# Patient Record
Sex: MALE | Race: WHITE | Employment: UNEMPLOYED | URBAN - METROPOLITAN AREA
[De-identification: names, ages, dates, MRNs, and addresses within clinical notes are randomized per-mention and may not be internally consistent; named-entity substitution may affect disease eponyms.]

---

## 2022-10-22 ENCOUNTER — HOSPITAL ENCOUNTER (EMERGENCY)
Age: 37
Discharge: HOME | End: 2022-10-22
Attending: EMERGENCY MEDICINE

## 2022-10-22 VITALS
OXYGEN SATURATION: 98 % | HEART RATE: 78 BPM | DIASTOLIC BLOOD PRESSURE: 70 MMHG | TEMPERATURE: 97 F | RESPIRATION RATE: 18 BRPM | SYSTOLIC BLOOD PRESSURE: 129 MMHG

## 2022-10-22 DIAGNOSIS — H61.23 BILATERAL IMPACTED CERUMEN: Primary | ICD-10-CM

## 2022-10-22 PROCEDURE — 99283 EMERGENCY DEPT VISIT LOW MDM: CPT

## 2022-10-22 PROCEDURE — 69210 REMOVE IMPACTED EAR WAX UNI: CPT

## 2022-10-22 PROCEDURE — 6370000000 HC RX 637 (ALT 250 FOR IP): Performed by: STUDENT IN AN ORGANIZED HEALTH CARE EDUCATION/TRAINING PROGRAM

## 2022-10-22 RX ORDER — IBUPROFEN 800 MG/1
800 TABLET ORAL ONCE
Status: COMPLETED | OUTPATIENT
Start: 2022-10-22 | End: 2022-10-22

## 2022-10-22 RX ADMIN — IBUPROFEN 800 MG: 800 TABLET, FILM COATED ORAL at 07:09

## 2022-10-22 RX ADMIN — DOCUSATE SODIUM 50 MG: 50 LIQUID ORAL at 08:40

## 2022-10-22 ASSESSMENT — PAIN SCALES - GENERAL: PAINLEVEL_OUTOF10: 5

## 2022-10-22 NOTE — ED PROVIDER NOTES
[facility] ED     Emergency Department     Faculty Attestation    I performed a history and physical examination of the patient and discussed management with the resident. I reviewed the residents note and agree with the documented findings and plan of care. Any areas of disagreement are noted on the chart. I was personally present for the key portions of any procedures. I have documented in the chart those procedures where I was not present during the key portions. I have reviewed the emergency nurses triage note. I agree with the chief complaint, past medical history, past surgical history, allergies, medications, social and family history as documented unless otherwise noted below. For Physician Assistant/ Nurse Practitioner cases/documentation I have personally evaluated this patient and have completed at least one if not all key elements of the E/M (history, physical exam, and MDM). Additional findings are as noted. Ear pain bilateral cerumen impaction. No other URI or fever.   Will irrigate likely discharge home with drops      Critical Care     none    Velia Almeida MD, Indra Vazquez  Attending Emergency  Physician           Velia Almeida MD  10/22/22 4434

## 2022-10-22 NOTE — ED PROVIDER NOTES
[address]  ED  Emergency Department Encounter  Emergency Medicine Resident     Pt Name: Andrew Walters  MRN: [id number]  [place] 1985  Date of evaluation: 10/22/22  PCP:  Bartolo Arambula MD    CHIEF COMPLAINT       Chief Complaint   Patient presents with    Otalgia       HISTORY Dariusz  (Location/Symptom, Timing/Onset, Context/Setting, Quality, Duration, Modifying Factors,Severity.)      Andrew Walters is a 42 y. o.yo male who presents with ear pain, more over his left side. Patient states he has noticed ear popping. He states that the pain is a 5 out of 10, has not taken anything for the pain. He denies any feeling of dizziness, no fever, no chills, no chest pain, or shortness of breath, no blurry vision, no neck pain. PAST MEDICAL / SURGICAL / SOCIAL / FAMILY HISTORY      has a past medical history of Chronic back pain and Headache.     has no past surgical history on file. Social History     Socioeconomic History    Marital status: Single     Spouse name: Not on file    Number of children: Not on file    Years of education: Not on file    Highest education level: Not on file   Occupational History    Not on file   Tobacco Use    Smoking status: Every Day     Packs/day: 1.50     Years: 20.00     Pack years: 30.00     Types: Cigarettes     Start date: 4/15/1996    Smokeless tobacco: Current     Types: Chew   Substance and Sexual Activity    Alcohol use:  Yes     Alcohol/week: 1.0 standard drink     Types: 1 Cans of beer per week    Drug use: No    Sexual activity: Yes     Partners: Female   Other Topics Concern    Not on file   Social History Narrative    Not on file     Social Determinants of Health     Financial Resource Strain: Not on file   Food Insecurity: Not on file   Transportation Needs: Not on file   Physical Activity: Not on file   Stress: Not on file   Social Connections: Not on file   Intimate Partner Violence: Not on file   Housing Stability: Not on file History reviewed. No pertinent family history. Allergies:  Patient has no known allergies. Home Medications:  Prior to Admission medications    Not on File       REVIEW OFSYSTEMS    (2-9 systems for level 4, 10 or more for level 5)      Review of Systems   Constitutional:  Negative for fatigue and fever. HENT:  Positive for tinnitus. Negative for congestion. Ear popping   Eyes:  Negative for photophobia and visual disturbance. Respiratory:  Negative for cough and shortness of breath. Cardiovascular:  Negative for chest pain and leg swelling. Gastrointestinal:  Negative for nausea and vomiting. Genitourinary:  Negative for enuresis and flank pain. Musculoskeletal:  Negative for back pain. Skin:  Negative for rash and wound. Neurological:  Negative for facial asymmetry and headaches. Psychiatric/Behavioral:  Negative for behavioral problems and confusion. PHYSICAL EXAM   (up to 7 for level 4, 8 or more forlevel 5)      INITIAL VITALS:   ED Triage Vitals [10/22/22 0645]   BP Temp Temp Source Heart Rate Resp SpO2 Height Weight   129/70 97 °F (36.1 °C) Oral 78 18 98 % -- --       Physical Exam  Constitutional:       Appearance: Normal appearance. HENT:      Head: Normocephalic. Right Ear: There is impacted cerumen. Left Ear: There is impacted cerumen. Ears:      Comments: He does have bilateral cerumen impaction, no signs of otitis media or otitis externa, no mastoid tendernes     Mouth/Throat:      Mouth: Mucous membranes are moist.   Eyes:      Pupils: Pupils are equal, round, and reactive to light. Cardiovascular:      Pulses: Normal pulses. Pulmonary:      Effort: Pulmonary effort is normal. No respiratory distress. Abdominal:      General: There is no distension. Palpations: Abdomen is soft. Tenderness: There is no abdominal tenderness. Musculoskeletal:         General: No swelling. Cervical back: Normal range of motion.  No rigidity. Skin:     General: Skin is warm. Capillary Refill: Capillary refill takes less than 2 seconds. Coloration: Skin is not jaundiced. Neurological:      General: No focal deficit present. Mental Status: He is alert and oriented to person, place, and time. Psychiatric:         Mood and Affect: Mood normal.         Behavior: Behavior normal.       DIFFERENTIAL  DIAGNOSIS     PLAN (LABS / IMAGING / EKG):  No orders of the defined types were placed in this encounter. MEDICATIONS ORDERED:  Orders Placed This Encounter   Medications    ibuprofen (ADVIL;MOTRIN) tablet 800 mg    docusate (COLACE) 50 MG/5ML liquid 50 mg       Initial MDM/Plan: 42 y.o. male who presents with otalgia. On physical exam he does have bilateral cerumen impaction, no signs of otitis media or otitis externa, no mastoid tenderness    Plan for irrigation of ear using liquid Colace. We will plan for discharge after irrigation. DIAGNOSTIC RESULTS / EMERGENCYDEPARTMENT COURSE / MDM     LABS:  Labs Reviewed - No data to display      RADIOLOGY:  No results found. EKG      All EKG's are interpreted by the Emergency Department Physicianwho either signs or Co-signs this chart in the absence of a cardiologist.    EMERGENCY DEPARTMENT COURSE:          PROCEDURES:  None    CONSULTS:  None    CRITICAL CARE:      FINAL IMPRESSION      1. Bilateral impacted cerumen          DISPOSITION / PLAN     DISPOSITION Decision To Discharge 10/22/2022 09:01:42 AM      PATIENT REFERRED TO:  [facility]  [address] [facility] [address]  [phone]    If symptoms worsen    Zac Hensley MD  [address]   [place] [id number] [phone]      As needed    DISCHARGE MEDICATIONS:  Discharge Medication List as of 10/22/2022  9:02 AM          Ramila Young MD  Emergency Medicine Resident    (Please note that portions of this note were completed with a voice recognition program.Efforts were made to edit the dictations but occasionally words are mis-transcribed.)        Jose Piedra MD  Resident  10/23/22 0770

## 2022-10-23 ASSESSMENT — ENCOUNTER SYMPTOMS
COUGH: 0
PHOTOPHOBIA: 0
BACK PAIN: 0
SHORTNESS OF BREATH: 0
NAUSEA: 0
VOMITING: 0

## 2022-11-04 ENCOUNTER — TELEPHONE (OUTPATIENT)
Dept: FAMILY MEDICINE CLINIC | Age: 37
End: 2022-11-04

## 2022-11-04 NOTE — TELEPHONE ENCOUNTER
CP- to schedule new patient appointment, [facility] F/U, from 10/22/2022, no answer/no v-mail, invalid phone number [phone].

## 2023-06-29 ENCOUNTER — HOSPITAL ENCOUNTER (EMERGENCY)
Age: 38
Discharge: HOME | End: 2023-06-29
Attending: EMERGENCY MEDICINE

## 2023-06-29 VITALS
RESPIRATION RATE: 16 BRPM | OXYGEN SATURATION: 96 % | SYSTOLIC BLOOD PRESSURE: 134 MMHG | WEIGHT: 188.27 LBS | BODY MASS INDEX: 23.53 KG/M2 | TEMPERATURE: 98.2 F | DIASTOLIC BLOOD PRESSURE: 77 MMHG | HEART RATE: 84 BPM

## 2023-06-29 DIAGNOSIS — K08.89 PAIN, DENTAL: Primary | ICD-10-CM

## 2023-06-29 PROCEDURE — 6370000000 HC RX 637 (ALT 250 FOR IP): Performed by: EMERGENCY MEDICINE

## 2023-06-29 PROCEDURE — 99283 EMERGENCY DEPT VISIT LOW MDM: CPT

## 2023-06-29 RX ORDER — IBUPROFEN 400 MG/1
600 TABLET ORAL ONCE
Status: COMPLETED | OUTPATIENT
Start: 2023-06-29 | End: 2023-06-29

## 2023-06-29 RX ORDER — PENICILLIN V POTASSIUM 500 MG/1
500 TABLET ORAL 4 TIMES DAILY
Qty: 28 TABLET | Refills: 0 | Status: SHIPPED | OUTPATIENT
Start: 2023-06-29 | End: 2023-07-06

## 2023-06-29 RX ORDER — ACETAMINOPHEN 500 MG
1000 TABLET ORAL ONCE
Status: COMPLETED | OUTPATIENT
Start: 2023-06-29 | End: 2023-06-29

## 2023-06-29 RX ADMIN — ACETAMINOPHEN 1000 MG: 500 TABLET ORAL at 04:19

## 2023-06-29 RX ADMIN — IBUPROFEN 600 MG: 400 TABLET, FILM COATED ORAL at 04:19

## 2023-06-29 ASSESSMENT — PAIN DESCRIPTION - DESCRIPTORS: DESCRIPTORS: SHARP

## 2023-06-29 ASSESSMENT — PAIN DESCRIPTION - ORIENTATION: ORIENTATION: LOWER

## 2023-06-29 ASSESSMENT — ENCOUNTER SYMPTOMS
VOMITING: 0
COUGH: 0
VOICE CHANGE: 0
TROUBLE SWALLOWING: 0
ABDOMINAL PAIN: 0
SHORTNESS OF BREATH: 0
NAUSEA: 0

## 2023-06-29 ASSESSMENT — PAIN SCALES - GENERAL: PAINLEVEL_OUTOF10: 10

## 2023-06-29 ASSESSMENT — PAIN DESCRIPTION - LOCATION: LOCATION: TEETH

## 2023-06-29 ASSESSMENT — PAIN - FUNCTIONAL ASSESSMENT: PAIN_FUNCTIONAL_ASSESSMENT: 0-10

## 2023-07-07 ENCOUNTER — HOSPITAL ENCOUNTER (EMERGENCY)
Age: 38
Discharge: HOME | End: 2023-07-07
Attending: EMERGENCY MEDICINE

## 2023-07-07 ENCOUNTER — APPOINTMENT (OUTPATIENT)
Dept: GENERAL RADIOLOGY | Age: 38
End: 2023-07-07

## 2023-07-07 VITALS
RESPIRATION RATE: 14 BRPM | HEIGHT: 75 IN | WEIGHT: 173 LBS | SYSTOLIC BLOOD PRESSURE: 116 MMHG | OXYGEN SATURATION: 97 % | BODY MASS INDEX: 21.51 KG/M2 | TEMPERATURE: 98.2 F | DIASTOLIC BLOOD PRESSURE: 79 MMHG | HEART RATE: 90 BPM

## 2023-07-07 DIAGNOSIS — S41.111A LACERATION OF RIGHT UPPER EXTREMITY, INITIAL ENCOUNTER: Primary | ICD-10-CM

## 2023-07-07 PROCEDURE — 12002 RPR S/N/AX/GEN/TRNK2.6-7.5CM: CPT

## 2023-07-07 PROCEDURE — 99283 EMERGENCY DEPT VISIT LOW MDM: CPT

## 2023-07-07 PROCEDURE — 73070 X-RAY EXAM OF ELBOW: CPT

## 2023-07-07 PROCEDURE — 6370000000 HC RX 637 (ALT 250 FOR IP)

## 2023-07-07 RX ORDER — ACETAMINOPHEN 500 MG
1000 TABLET ORAL ONCE
Status: COMPLETED | OUTPATIENT
Start: 2023-07-07 | End: 2023-07-07

## 2023-07-07 RX ORDER — IBUPROFEN 600 MG/1
600 TABLET ORAL ONCE
Status: COMPLETED | OUTPATIENT
Start: 2023-07-07 | End: 2023-07-07

## 2023-07-07 RX ADMIN — IBUPROFEN 600 MG: 600 TABLET, FILM COATED ORAL at 21:44

## 2023-07-07 RX ADMIN — ACETAMINOPHEN 1000 MG: 500 TABLET ORAL at 21:44

## 2023-07-07 ASSESSMENT — PAIN SCALES - GENERAL
PAINLEVEL_OUTOF10: 10
PAINLEVEL_OUTOF10: 6

## 2023-07-07 ASSESSMENT — ENCOUNTER SYMPTOMS
BACK PAIN: 0
COLOR CHANGE: 0

## 2023-07-08 NOTE — DISCHARGE INSTRUCTIONS
Go to your primary care physician or return to the emergency department in 7-10 days to have your sutures removed. Call occupational health on Monday to follow-up on your work injury. For pain use acetaminophen (Tylenol) or ibuprofen (Motrin / Advil), unless prescribed medications that have acetaminophen or ibuprofen (or similar medications) in it. You can take over the counter acetaminophen tablets (1 - 2 tablets of the 500-mg strength every 6 hours) or ibuprofen tablets (2 tablets every 4 hours). You can shower with the laceration, would avoid baths or swimming in lakes / rivers. Apply bacitracin / triple antibiotic ointment / Neosporin / Vaseline to the wound twice a day. When you go outside, place sunscreen on the healing wound after the sutures have been removed for the next year to help with scarring. Monitor for signs and symptoms of infection such as increased redness, swelling, pain, drainage or odor. Any of these symptoms warrant an immediate re-evaluation. PLEASE RETURN TO THE EMERGENCY DEPARTMENT IMMEDIATELY for worsening symptoms, redness around the wound or redness streaking up the body part, white drainage from the wound, or if you develop any concerning symptoms such as: high fever not relieved by acetaminophen (Tylenol) and/or ibuprofen (Motrin / Advil), chills, shortness of breath, chest pain, feeling of your heart fluttering or racing, persistent nausea and/or vomiting, vomiting up blood, blood in your stool, numbness, loss of consciousness, weakness or tingling in the arms or legs or change in color of the extremities, changes in mental status, persistent headache, blurry vision, loss of bladder / bowel control, unable to follow up with your physician, or other any other care or concern.

## 2023-07-08 NOTE — ED PROVIDER NOTES
[address] [facility] ED  eMERGENCY dEPARTMENT eNCOUnter      Pt Name: Lysle Phoenix  MRN: [id number]  [address] [facility] 1985  Date of evaluation: 7/7/2023  Provider: ULISES Velasquez CNP    CHIEF COMPLAINT       Chief Complaint   Patient presents with    Laceration     R arm cut at work on metal shelf         HISTORY OF PRESENT ILLNESS  (Location/Symptom, Timing/Onset, Context/Setting, Quality, Duration, Modifying Factors, Severity.)   Lysle Phoenix is a 42 y.o. male who presents to the emergency department with complaint of laceration to his right elbow. Patient states this occurred at work approximately 2 hours ago, states it was cleaned out and had Cetacaine spray applied by staff at work. No Tylenol or Motrin prior to arrival.  Patient states his last tetanus immunization was within the last 5 years however cannot member the exact date. No active bleeding. Nursing Notes were reviewed. ALLERGIES     Patient has no known allergies. CURRENT MEDICATIONS       Previous Medications    No medications on file       PAST MEDICAL HISTORY         Diagnosis Date    Chronic back pain     Headache        SURGICAL HISTORY     History reviewed. No pertinent surgical history. FAMILY HISTORY     History reviewed. No pertinent family history. No family status information on file. SOCIAL HISTORY      reports that he has been smoking cigarettes. He started smoking about 27 years ago. He has a 30.00 pack-year smoking history. His smokeless tobacco use includes chew. He reports current alcohol use of about 1.0 standard drink per week. He reports that he does not use drugs. REVIEW OF SYSTEMS    (2-9 systems for level 4, 10 or more for level 5)   Review of Systems   Constitutional:  Negative for chills, diaphoresis, fatigue and fever. Genitourinary:  Negative for frequency. Musculoskeletal:  Negative for arthralgias, back pain, joint swelling, myalgias and neck pain.    Skin:  Positive for wound

## 2023-07-14 NOTE — ED PROVIDER NOTES
eMERGENCY dEPARTMENT eNCOUnter   [address]  Name: Lionel Douglas  MRN: [id number]  [address] [facility] 1985  Date of evaluation: 7/14/23     Lionel Douglas is a 42 y.o. male with CC: Laceration (R arm cut at work on metal shelf)        This visit was performed by both a physician and an APC. I performed all aspects of the MDM as documented.       Marlin Berkowitz MD  Attending Emergency Physician           Marlin Berkowitz MD  07/14/23 1754